# Patient Record
Sex: MALE | Race: WHITE | NOT HISPANIC OR LATINO | Employment: STUDENT | URBAN - METROPOLITAN AREA
[De-identification: names, ages, dates, MRNs, and addresses within clinical notes are randomized per-mention and may not be internally consistent; named-entity substitution may affect disease eponyms.]

---
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
## 2022-04-20 ENCOUNTER — TELEPHONE (OUTPATIENT)
Dept: OTOLARYNGOLOGY | Facility: CLINIC | Age: 22
End: 2022-04-20
Payer: MEDICAID

## 2022-04-20 ENCOUNTER — TELEPHONE (OUTPATIENT)
Dept: GENETICS | Facility: CLINIC | Age: 22
End: 2022-04-20
Payer: MEDICAID

## 2022-04-20 DIAGNOSIS — Q85.01 NEUROFIBROMATOSIS, TYPE 1: Primary | ICD-10-CM

## 2022-04-20 NOTE — TELEPHONE ENCOUNTER
Spoke with pt in reference to he is asking for an adult referral for Neurology. Pt stated that he has not been to Neurology since he was told he had to transition from ped's to adult. I ask pt if he was having any problems, and he stated that he just needs a check up. I informed pt that I would send the message to Dr Mann. Pt verbalized understanding. Please advise

## 2022-04-20 NOTE — TELEPHONE ENCOUNTER
----- Message from Ritika Carvajal sent at 4/20/2022 11:36 AM CDT -----      Please contact patient to schedule NP appt. He was last seen by Dr. Santos in 2017  Patient can be contacted @# [phone]

## 2022-04-20 NOTE — TELEPHONE ENCOUNTER
----- Message from Josselin Vargas sent at 4/20/2022  4:30 PM CDT -----  Contact: Carli [phone]  Pt is calling regards to a referral for an adult neurologist. Please advise    Carli [phone]

## 2022-04-21 ENCOUNTER — TELEPHONE (OUTPATIENT)
Dept: NEUROLOGY | Facility: CLINIC | Age: 22
End: 2022-04-21
Payer: MEDICAID

## 2022-04-21 NOTE — TELEPHONE ENCOUNTER
Called and spoke with pt. Explained do not have provider who sees for neurofibromatosis. Gave number to [facility] and [facility] Neurofibromatosis [phone]. Pt verbalized understanding.

## 2022-04-21 NOTE — TELEPHONE ENCOUNTER
----- Message from [facility] sent at 4/21/2022  8:27 AM CDT -----  Type:  Needs Medical Advice    Who Called: PT   Would the patient rather a call back or a response via [facility]? Callback   Best Call Back Number: [phone]  Additional Information: PT requesting callback from office  to set up neurology appt from referral. Unable to schedule from my end due to pt having medicaid and provider scheduling restrictions.

## 2022-04-21 NOTE — TELEPHONE ENCOUNTER
Spoke with pt and informed him that Dr Mann has placed the adult Neurology referral. I informed pt that he can call [phone] to schedule the appointment. Pt verbalized understanding.

## 2022-05-04 ENCOUNTER — TELEPHONE (OUTPATIENT)
Dept: GENETICS | Facility: CLINIC | Age: 22
End: 2022-05-04
Payer: MEDICAID

## 2022-05-04 NOTE — TELEPHONE ENCOUNTER
----- Message from Manasa Calvillo sent at 5/4/2022  4:00 PM CDT -----  Contact: Self [phone]  Would like to receive medical advice.    Would they like a call back or a response via [facility]:  call back     Additional information:  Calling to request pt neuro referral sent to [facility].

## 2022-06-06 ENCOUNTER — TELEPHONE (OUTPATIENT)
Dept: GENETICS | Facility: CLINIC | Age: 22
End: 2022-06-06
Payer: MEDICAID

## 2022-08-10 ENCOUNTER — TELEPHONE (OUTPATIENT)
Dept: GENETICS | Facility: CLINIC | Age: 22
End: 2022-08-10
Payer: MEDICAID

## 2022-08-11 ENCOUNTER — TELEPHONE (OUTPATIENT)
Dept: NEUROLOGY | Facility: CLINIC | Age: 22
End: 2022-08-11
Payer: MEDICAID

## 2022-08-11 ENCOUNTER — OFFICE VISIT (OUTPATIENT)
Dept: GENETICS | Facility: CLINIC | Age: 22
End: 2022-08-11
Payer: MEDICAID

## 2022-08-11 VITALS
SYSTOLIC BLOOD PRESSURE: 111 MMHG | DIASTOLIC BLOOD PRESSURE: 49 MMHG | HEART RATE: 71 BPM | BODY MASS INDEX: 22.87 KG/M2 | WEIGHT: 159.75 LBS | HEIGHT: 70 IN

## 2022-08-11 DIAGNOSIS — Q85.01 NEUROFIBROMATOSIS, TYPE 1: Primary | ICD-10-CM

## 2022-08-11 DIAGNOSIS — D36.11 NEUROFIBROMA OF FACE: ICD-10-CM

## 2022-08-11 PROCEDURE — 1160F PR REVIEW ALL MEDS BY PRESCRIBER/CLIN PHARMACIST DOCUMENTED: ICD-10-PCS | Mod: CPTII,,, | Performed by: MEDICAL GENETICS

## 2022-08-11 PROCEDURE — 3008F BODY MASS INDEX DOCD: CPT | Mod: CPTII,,, | Performed by: MEDICAL GENETICS

## 2022-08-11 PROCEDURE — 3078F DIAST BP <80 MM HG: CPT | Mod: CPTII,,, | Performed by: MEDICAL GENETICS

## 2022-08-11 PROCEDURE — 99205 PR OFFICE/OUTPT VISIT, NEW, LEVL V, 60-74 MIN: ICD-10-PCS | Mod: S$PBB,,, | Performed by: MEDICAL GENETICS

## 2022-08-11 PROCEDURE — 1159F MED LIST DOCD IN RCRD: CPT | Mod: CPTII,,, | Performed by: MEDICAL GENETICS

## 2022-08-11 PROCEDURE — 3074F SYST BP LT 130 MM HG: CPT | Mod: CPTII,,, | Performed by: MEDICAL GENETICS

## 2022-08-11 PROCEDURE — 3078F PR MOST RECENT DIASTOLIC BLOOD PRESSURE < 80 MM HG: ICD-10-PCS | Mod: CPTII,,, | Performed by: MEDICAL GENETICS

## 2022-08-11 PROCEDURE — 1160F RVW MEDS BY RX/DR IN RCRD: CPT | Mod: CPTII,,, | Performed by: MEDICAL GENETICS

## 2022-08-11 PROCEDURE — 99417 PROLNG OP E/M EACH 15 MIN: CPT | Mod: S$PBB,,, | Performed by: MEDICAL GENETICS

## 2022-08-11 PROCEDURE — 99417 PR PROLONGED SVC, OUTPT, W/WO DIRECT PT CONTACT,  EA ADDTL 15 MIN: ICD-10-PCS | Mod: S$PBB,,, | Performed by: MEDICAL GENETICS

## 2022-08-11 PROCEDURE — 99214 OFFICE O/P EST MOD 30 MIN: CPT | Mod: PBBFAC | Performed by: MEDICAL GENETICS

## 2022-08-11 PROCEDURE — 99205 OFFICE O/P NEW HI 60 MIN: CPT | Mod: S$PBB,,, | Performed by: MEDICAL GENETICS

## 2022-08-11 PROCEDURE — 3008F PR BODY MASS INDEX (BMI) DOCUMENTED: ICD-10-PCS | Mod: CPTII,,, | Performed by: MEDICAL GENETICS

## 2022-08-11 PROCEDURE — 99999 PR PBB SHADOW E&M-EST. PATIENT-LVL IV: CPT | Mod: PBBFAC,,, | Performed by: MEDICAL GENETICS

## 2022-08-11 PROCEDURE — 1159F PR MEDICATION LIST DOCUMENTED IN MEDICAL RECORD: ICD-10-PCS | Mod: CPTII,,, | Performed by: MEDICAL GENETICS

## 2022-08-11 PROCEDURE — 99999 PR PBB SHADOW E&M-EST. PATIENT-LVL IV: ICD-10-PCS | Mod: PBBFAC,,, | Performed by: MEDICAL GENETICS

## 2022-08-11 PROCEDURE — 3074F PR MOST RECENT SYSTOLIC BLOOD PRESSURE < 130 MM HG: ICD-10-PCS | Mod: CPTII,,, | Performed by: MEDICAL GENETICS

## 2022-08-11 NOTE — PROGRESS NOTES
Carli Gunn  DOS: 22  : 00  MRN: [id number]    DIAGNOSIS:  Neurofibromatosis type 1.    PRESENT ILLNESS: Carli is now a 22-year-old  male who was previously diagnosed with NF1 and I had seen him last in 2019. He met minimal clinical criteria for the diagnosis of NF1 and I sent him for ophthalmologic examination and hearing tests as well as to measure his blood pressure, which was normal. Hes followed by ophtho and has Lisch nodules and myopic astigmatism.    Carli now returns for a followup. He did not go to college but is working at [employer] and is doing well. He had Lasik which helped his vision. His blood pressure has been normal. He has a forehead mass (most likely neurofibroma) which has been growing very slowly and Jeffery previously referred him to Dr. Art for excision. However, he decided not to have it done because hed lose sensation to a part of a forehead. However, it grew and started bothering him cosmetically. Today hes inquiring if he could see neurosurgery again.    FAMILY HISTORY: Carli has 24 and 29-year-old sisters and a 23-year-old brother, all of whom have no learning disabilities and no cafe au lait spots.  Further family history is noncontributory.    PHYSICAL EXAM: Wt 159 lbs, Ht 510, BMI 22. /49, HR 71. Midparental height 25-50%. Again, Carli has a normocephalic head and no appreciable dysmorphic facial features, but he does have 1 café au lait spot on his right cheek. He has multiple café au lait spots with at least 6 or more, more than 5 mm in diameter. He also has bilateral axillary and inguinal freckling as well as subcutaneous nodules in the right and left hands, as well as left upper forehead that probably are consistent with neurofibromas but there was no biopsy. His forehead mass has slightly increased in size and he has a brown pigmentation on top. He does not have any pain or discomfort but it does have cosmetic concern to him.         IMPRESSION: At  this time, I had another discussion with the patient about the further natural course of NF1. This is an incurable life-long disease that predisposes to neurofibroma, optic glioma and learning disabilities. There is also a slightly higher risk of malignant peripheral sheath tumors. The mainstay of the treatment is surveillance of the eyes for optic glioma and therefore annual ophthalmologic exams. His blood pressure today and in the past was normal.    Since his forehead mass (presumed neurofibroma) has gotten bigger and it bothers him psychologically, Jeffery advised to see Dr. Art again for a surgical excision but he understands the risks. Theres a medication that could potentially be tried (Selumetinib or brand Koselugo) but it is FDA approved for inoperable plexiform neurofibroma.     RECOMMENDATIONS:   1. Ophthalmologic examination annually.   2. Regular blood pressure measurements.   3. Referral to neurosurgery for the forehead mass (likely neurofibroma) in the future.  4. Vitamin D level was low in the past, he was told to take 2000 IU per day but he didnt. Jeffery instructed him to take it.  5. DXA scan.  6. Referral to vascular team to assess for vascular manifestations of NF1.  7. Follow up in 2 years.    Recent General Guidelines for NF1 care:    HTN  1) For hypertensive NF1 patients who are under 30 years, pregnant and/or have abdominal bruits, causes of renovascular hypertension should be first evaluated.  2) For patients with impaired renal function (GFR <30mL/min) spiral CT and CT angiography may be used. If imaging is negative, consider formal renal angiography. Concomitant screening for pheochromocytoma with plasma free metanephrines also is recommended.  3) Treatment for NF1-associated hypertension should be tailored to the specific etiology.   4) Selective imaging should be performed for patients in whom there is clinical suspicion of a vascular lesion.  5) For identified vascular abnormalities,  decisions regarding timing and type of intervention should be the same as for patients without NF1.     Pheo  1) Pheochromocytoma should be considered in hypertensive NF1 patients who are over 30 years, pregnant and/or have paroxysmal hypertension, hypertension-associated headache, palpitations or sweating.   2) Biochemical or imaging screening in asymptomatic patients with NF1 for pheochromocytoma is not recommended.  3) Plasma free metanephrines as a single test is more sensitive and specific than other studies in an NF1 patient clinically suspected to harbor a pheochromocytoma. A follow-up 24-hour urine collection for catecholamines and metanephrines should be done if plasma testing is equivocal (less than 4-fold elevation).    Bone  1) Although lacking published efficacy, vitamin D supplementation is recommended in individuals with NF1 to reach serum 25-hydroxyvitamin D concentrations in the sufficient range.  2) Osteoporosis occurs at an earlier age in adults with NF1 and progresses over time. Treatment is not different from what is recommended in the general population.   3) Management of scoliosis in NF1 adults consists of monitoring for curve progression. All individuals with NF1 should have annual clinical evaluation of the back.     Cosmesis  1) Current treatment options for cNF include surgical excision, laser removal, or electrodessication. Physician and patient preference and local expertise typically guides choice of these options.   2) Minimal scarring, minor discomfort, and high patient satisfaction has been reported with all three therapies. General anesthesia may be required for the removal of many lesions.   3) Non-surgical therapies (medication and devices) are under development but are not clinically available at this time.  4) NF1-associated pruritus may be neuropathic in origin and thus may be responsive to localized and generalized therapies (including gabapentin and pregabalin), but  NF1-specific data are lacking 97.    Psych  1) Consider screening for depression in adults with NF1, with appropriate referral.  2) There are limited data on the prevalence of ADHD in adults with NF1. ADHD can affect employability and schooling.  3) More severe cognitive impairment merits thorough evaluation (e.g., microarray, fragile X testing, concomitant monogenic disorders) for other etiologies.    Neuro  1) NF1-associated migraine, seizures or sleep disorders should be treated as in the non-NF1 population.  2) Medication, physical therapy, and surgery (for compressive tumors) may be beneficial NF1 neuropathy.   3) Adults with NF1 should be queried about chronic fingertip and toe pain in the assessment of possible glomus tumors.  4) Screening and use of pain interference scales may be useful, with referral to a pain clinic as needed, preferably those that employ both pharmacologic and non-pharmacologic approaches.    MPNST  1) Consider referral to specialized high-volume centers for MPNST diagnosis and management.  2) Imaging of an asymptomatic PN is a clinical judgment. If a lesion involves only subcutaneous tissue with no deeper component, imaging may not be necessary.  3) If a deeper component of a PN is suspected, imaging can be useful to assess the full extent of the lesion, serve as a baseline to  future growth, and to identify whether there is a nodular component that may be associated with an increased risk of malignant change 28.  4) MRI is preferred over CT scan to reduce ionizing radiation exposure.  5) Educate patients about MPNST signs and symptoms at initial and follow-up visits.  6) Theres no evidence supporting special screening for rarer NF1-associated malignancies in asymptomatic patients.    Summary  Although it is important to always consider NF1-associated etiologies for a new sign or symptom, common explanations will remain common (e.g., most hypertension is essential and not  pheochromocytoma-related, most back pain will not be due to an MPNST, most headaches will not be tumor-associated). Repeated patient education and sensitization to worrisome signs and symptoms like progressive severe pain (MPNST), changes in tumor volume (MPNST), new, unexplained neurologic symptoms (MPNST, CNS tumors) and diaphoresis/palpitations (pheochromocytoma) are important.     REFERENCES:   - Doug DR, Ady BR, Amaya KL, William DA, Thomas K. Care of adults with neurofibromatosis type 1: a clinical practice resource of the American College of Medical Genetics and Genomics (ACMG). [employer] Med. 2018 Jul;20(7):671-682.  - DOUG Jensen., ELYSSA Schmidt., ELYSSA Lopez, MARKO Cruz., & ALEX Cardozo. (2007). Neurofibromatosis type 1 in genetic counseling practice: recommendations of the National Society of Genetic Counselors. Journal of genetic counseling, 16(4), 387-407.  - Gloria STEPHENS. Neurofibromatosis 1. 1998 Oct 2 [Updated 2014 Sep 4]. In: Yobani RA, Zach MP, Carine HH, et al., editors. Plasmon® [Internet]. [place] ([place]): [facility], [place]; 4363-0869. Available from: http://www.ncbi.nlm.nih.gov/books/ZOE9042/    Time spent: 60 minutes, more than 50% was spent in counseling. Additional 60 minutes were spent without direct contact, on research of the patients complex medical findings as a part of her condition NF1 and formulating further diagnostic steps and treatment. The note is in epic.     Antolin Mann M.D.                                                                         Section Head - Medical Genetics                                                            [facility]

## 2022-12-06 ENCOUNTER — TELEPHONE (OUTPATIENT)
Dept: NEUROLOGY | Facility: CLINIC | Age: 22
End: 2022-12-06
Payer: MEDICAID

## 2022-12-06 NOTE — TELEPHONE ENCOUNTER
I spoke to the patient's mother, and she confirmed patient appointment on Thursday, 12/8 at 8:40am with Dr. Lynn.

## 2022-12-08 ENCOUNTER — OFFICE VISIT (OUTPATIENT)
Dept: NEUROLOGY | Facility: CLINIC | Age: 22
End: 2022-12-08
Payer: MEDICAID

## 2022-12-08 ENCOUNTER — TELEPHONE (OUTPATIENT)
Dept: NEUROSURGERY | Facility: CLINIC | Age: 22
End: 2022-12-08
Payer: MEDICAID

## 2022-12-08 VITALS
HEIGHT: 70 IN | SYSTOLIC BLOOD PRESSURE: 130 MMHG | DIASTOLIC BLOOD PRESSURE: 88 MMHG | WEIGHT: 159 LBS | HEART RATE: 101 BPM | BODY MASS INDEX: 22.76 KG/M2

## 2022-12-08 DIAGNOSIS — D36.11 NEUROFIBROMA OF FACE: ICD-10-CM

## 2022-12-08 DIAGNOSIS — Q85.01 NEUROFIBROMATOSIS, TYPE 1: Primary | ICD-10-CM

## 2022-12-08 PROCEDURE — 3008F PR BODY MASS INDEX (BMI) DOCUMENTED: ICD-10-PCS | Mod: CPTII,,, | Performed by: PSYCHIATRY & NEUROLOGY

## 2022-12-08 PROCEDURE — 99999 PR PBB SHADOW E&M-EST. PATIENT-LVL III: ICD-10-PCS | Mod: PBBFAC,,, | Performed by: PSYCHIATRY & NEUROLOGY

## 2022-12-08 PROCEDURE — 3079F PR MOST RECENT DIASTOLIC BLOOD PRESSURE 80-89 MM HG: ICD-10-PCS | Mod: CPTII,,, | Performed by: PSYCHIATRY & NEUROLOGY

## 2022-12-08 PROCEDURE — 3075F SYST BP GE 130 - 139MM HG: CPT | Mod: CPTII,,, | Performed by: PSYCHIATRY & NEUROLOGY

## 2022-12-08 PROCEDURE — 99215 OFFICE O/P EST HI 40 MIN: CPT | Mod: S$PBB,,, | Performed by: PSYCHIATRY & NEUROLOGY

## 2022-12-08 PROCEDURE — 1160F PR REVIEW ALL MEDS BY PRESCRIBER/CLIN PHARMACIST DOCUMENTED: ICD-10-PCS | Mod: CPTII,,, | Performed by: PSYCHIATRY & NEUROLOGY

## 2022-12-08 PROCEDURE — 1160F RVW MEDS BY RX/DR IN RCRD: CPT | Mod: CPTII,,, | Performed by: PSYCHIATRY & NEUROLOGY

## 2022-12-08 PROCEDURE — 99215 PR OFFICE/OUTPT VISIT, EST, LEVL V, 40-54 MIN: ICD-10-PCS | Mod: S$PBB,,, | Performed by: PSYCHIATRY & NEUROLOGY

## 2022-12-08 PROCEDURE — 1159F MED LIST DOCD IN RCRD: CPT | Mod: CPTII,,, | Performed by: PSYCHIATRY & NEUROLOGY

## 2022-12-08 PROCEDURE — 99213 OFFICE O/P EST LOW 20 MIN: CPT | Mod: PBBFAC,PO | Performed by: PSYCHIATRY & NEUROLOGY

## 2022-12-08 PROCEDURE — 99999 PR PBB SHADOW E&M-EST. PATIENT-LVL III: CPT | Mod: PBBFAC,,, | Performed by: PSYCHIATRY & NEUROLOGY

## 2022-12-08 PROCEDURE — 3079F DIAST BP 80-89 MM HG: CPT | Mod: CPTII,,, | Performed by: PSYCHIATRY & NEUROLOGY

## 2022-12-08 PROCEDURE — 3008F BODY MASS INDEX DOCD: CPT | Mod: CPTII,,, | Performed by: PSYCHIATRY & NEUROLOGY

## 2022-12-08 PROCEDURE — 1159F PR MEDICATION LIST DOCUMENTED IN MEDICAL RECORD: ICD-10-PCS | Mod: CPTII,,, | Performed by: PSYCHIATRY & NEUROLOGY

## 2022-12-08 PROCEDURE — 3075F PR MOST RECENT SYSTOLIC BLOOD PRESS GE 130-139MM HG: ICD-10-PCS | Mod: CPTII,,, | Performed by: PSYCHIATRY & NEUROLOGY

## 2022-12-08 NOTE — TELEPHONE ENCOUNTER
----- Message from Monika Brush MA sent at 12/8/2022 10:00 AM CST -----  Regarding: RE: question please  Great, thank you.  When someone has a second, can you please reach out to this patient and help him schedule his referral? He has seen Dr. Art before but has a new consult order as well.     Thank you for advance for your help,  Monika HIDALGO     ----- Message -----  From: Gali Iniguez RN  Sent: 12/8/2022   9:54 AM CST  To: Monika Brush MA  Subject: RE: question please                              yes  ----- Message -----  From: Monika Brush MA  Sent: 12/8/2022   9:22 AM CST  To: Kaelyn Moss  Subject: question please                                  Hello,    We just saw this patient this morning and Dr. Lynn wanted to confirm with you that Dr. Dmitry Art sees both adult and pediatric patients?     Thank you for letting us know,  Monika HIDALGO

## 2022-12-08 NOTE — PROGRESS NOTES
[facility] NEUROLOGY  [facility], [place]    Date: 12/8/22  Patient Name: Carli Gunn   MRN: [id number]   PCP: Jacqui Morgan  Referring Provider: Antolin Mann MD    Chief Complaint: NF1  Subjective:   Patient seen in consultation at the request of Antolin Mann MD for the evaluation of NF1. A copy of this note will be sent to the referring physician.      HPI:   Mr. Carli Gunn is a 22 y.o. RH male presenting to establish care given his history of neurofibromatosis type 1 (Lisch nodules, freckling of the axilla, >6 cafe-au-lait spots, no history of optic glioma, at least 1 current large neurofibroma).  Patient has been followed by pediatric neurology in the past with no significant issues.  Established with Genetics.  Has seen Neurosurgery at least once in the past but several years ago.    Overall, the patient states that he is doing well but would like to discuss the removal of the neurofibroma present on the superior lateral forehead/anterior temporal region.  We immediately had a discussion that Neurology would not be the service to be removing such a lesion, but we would assist in scheduling him with correct service for that concern.  We then transitioned our discussion to a variety of other topics concerning his medical history and neurological state.    When prompted regarding headaches, the patient notes that he does infrequently suffer with tension headaches.  Less than 5 days a month, 3-4/10 pain on average, non pulsating, no light/sound sensitivity, no nausea/vomiting.  Duration less than 4 hours. Responds well to OTC analgesics.    No history of seizures or seizure-like activity.  No neuropathic pain in the arms or legs; also prompted specifically about pain in the fingertips and toes which was also denied.  No sleep complaints.  No changes in vision or hearing; most recent eye exam reportedly about 1 month ago.  No uncontrolled hypertension; readings today  "are mildly elevated over previous recordings but still within normal limits.  No history of depression/anxiety; endorses stable mood today.  No GI complaints including pain, constipation, diarrhea  No musculoskeletal complaints at this time; denies history of scoliosis diagnosis    PAST MEDICAL HISTORY:  Patient Active Problem List   Diagnosis    Neurofibromatosis, type 1    Lisch nodules - Both Eyes    Neurofibroma of face    Learning difficulty involving mathematics       PAST SURGICAL HISTORY:  Past Surgical History:   Procedure Laterality Date    CIRCUMCISION, PRIMARY         CURRENT MEDS:  No current outpatient medications on file.     No current facility-administered medications for this visit.       ALLERGIES:  Review of patient's allergies indicates:  No Known Allergies    FAMILY HISTORY:  Family History   Problem Relation Age of Onset    Amblyopia Neg Hx     Blindness Neg Hx     Cataracts Neg Hx     Diabetes Neg Hx     Glaucoma Neg Hx     Retinal detachment Neg Hx     Strabismus Neg Hx        SOCIAL HISTORY:  Social History     Tobacco Use    Smoking status: Never       Review of Systems:  Gen: no fever, no chills, no generalized feeling of weakness   HEENT: no double vision, no blurred vision, no eye pain, no eye exudates. no nasal congestion,   no traumatic injury of head, no neck pain, no neck stiffness. no photophobia or phonophobia at this time. ?    Heart: no chest pain, no SOB    Lungs: no SOB, no cough    MSK: no weakness of legs, intact ROM    ABD: no abd pain, no N/V/D/C, no difficulty with defecation.    Extremities: No leg pain, no edema.       Objective:     Vitals:    12/08/22 0838   BP: 130/88   Pulse: 101   Weight: 72.1 kg (159 lb)   Height: 5' 10" (1.778 m)     General: male in NAD, alert and awake, Aox3, well groomed. ?    ? ?    HEENT: Head is NC/AT EOMI, pupil size: 4 mm B/L, no nystagmus noted; hearing grossly intact b/l. Mucous membrane moist, uvula midline, no pharyngeal erythema, " exudates or discharges.     Probable neurofibroma present on the left superior lateral forehead/anterior temporal region about the hairline     Neck: Supple. no nuchal rigidity.      Cardiovascular: well perfused, no cyanosis        Respiratory: Symmetric chest rise noted       Musculoskeletal/extremities: Muscle tone noted to be adequate for patient age, muscle mass is WNL. No spontaneous movements or fasciculations noted during this examination. No pedal edema or calf tenderness. No cogwheel rigidity noted on B/L UE extremities.    Nodules subcutaneously in the bilateral hands  No significant scoliosis identified    Multiple cafe au lait spots observed on back, abdomen, arms     Neurological Examination.    Mental status: AA&O x3; Affect/mood is euthymic/congruent; no aphasia noted during examination. Patient answers simple questions appropriately & follows simple commands; no dysarthria or expressive aphasia; no lorenza-neglect or extinction. Vocabulary/word finding: excellent.       Cranial Nerves: II-XII grossly intact. visual fields intact to confrontation, EOMI, no nystagmus, PERRLA,?facial muscles symmetric and no facial droop noted, patient hearing is grossly intact b/l, ?facial sensation to sharp and light touch grossly intact B/L. Patient smiles, frowns, closes eyes ?forcefully uneventfully. Uvula midline and no difficulty with pronunciation. No SCM/trapezius/muscle of mastication weakness noted B/L. Patient has adequate control of tongue and may protrude it and move it adequately.       Muscle Function: Tone WNL and Muscle bulk WNL. Left elbow flexors/extensors (5/5), Left shoulder (5/5); left Finger flexor/extensors (5/5). Right elbow flexors/extensors (5/5). Right shoulder abduction/flexion (5/5), Right finger flexors/extensors (5/5). Left LE hip flexion/extension (5/5), Left Knee flexion/extension (5/5) and Left ankle flexion/extension/Eversion/inversion (5/5). Right LE hip flexion/extension (5/5), Right  Knee flexion/extension (5/5) and ankle flexion/extension/Eversion/inversion (5/5).       Sensory: ?Light touch throughout     Reflexes: Left and Right biceps, triceps, brachioradialis are 2+/4. patellar 2+/4 on Left and 2+/4 on Right, B/L Achilles 2+/4     Coordination: no dysmetria (finger to nose negative)     Gait: adequate casual gait with stride length and arm swing WNL. Tandem gait and walking on toes and heels are WNL     Assessment:   Carli Gunn is a 22 y.o. male presenting to [facility] care for his history of neurofibromatosis type 1.  The patient is currently at neurological baseline with no concerns from a General Neurology standpoint.      We discussed that he could continue OTC analgesic use for occasional tension-type headaches; was counseled extensively on the importance reaching out in the future if headaches change or worsen.      I encouraged the patient to continue his routine follow-up with Genetics and Ophthalmology.      We will assist in scheduling a new appointment with his previous neurosurgeon (or an appointment with a new service if they are unavailable) regarding probable neurofibroma of the left face.     Plan:     Problem List Items Addressed This Visit          Neuro    Neurofibromatosis, type 1 - Primary       Oncology    Neurofibroma of face     - continue routine follow-up with Genetics per their recommendations   - continue yearly eye exams with Ophthalmology  - continue occasional use of OTC analgesics for tension-type headaches; reach out to our clinic in the future if headaches change or worsen  - schedule appointment with Neurosurgery   - follow-up with our clinic in 1 year or sooner as needed    I spent a total of 60 minutes on the day of the visit. This includes face to face time and non-face to face time preparing to see the patient (eg, review of tests), obtaining and/or reviewing separately obtained history, documenting clinical information in the electronic or other  health record, independently interpreting results and communicating results to the patient/family/caregiver, or care coordinator.    A dictation device was used to produce this document. Use of such devices sometimes results in grammatical errors or replacement of words that sound similarly.    Brayden Lynn, DO

## 2023-02-13 ENCOUNTER — OFFICE VISIT (OUTPATIENT)
Dept: NEUROSURGERY | Facility: CLINIC | Age: 23
End: 2023-02-13
Payer: MEDICAID

## 2023-02-13 DIAGNOSIS — D36.11 NEUROFIBROMA OF FACE: Primary | ICD-10-CM

## 2023-02-13 DIAGNOSIS — Q85.01 NEUROFIBROMATOSIS, TYPE 1: ICD-10-CM

## 2023-02-13 PROCEDURE — 99203 OFFICE O/P NEW LOW 30 MIN: CPT | Mod: S$PBB,,, | Performed by: STUDENT IN AN ORGANIZED HEALTH CARE EDUCATION/TRAINING PROGRAM

## 2023-02-13 PROCEDURE — 99213 OFFICE O/P EST LOW 20 MIN: CPT | Mod: PBBFAC | Performed by: STUDENT IN AN ORGANIZED HEALTH CARE EDUCATION/TRAINING PROGRAM

## 2023-02-13 PROCEDURE — 99999 PR PBB SHADOW E&M-EST. PATIENT-LVL III: CPT | Mod: PBBFAC,,, | Performed by: STUDENT IN AN ORGANIZED HEALTH CARE EDUCATION/TRAINING PROGRAM

## 2023-02-13 PROCEDURE — 99999 PR PBB SHADOW E&M-EST. PATIENT-LVL III: ICD-10-PCS | Mod: PBBFAC,,, | Performed by: STUDENT IN AN ORGANIZED HEALTH CARE EDUCATION/TRAINING PROGRAM

## 2023-02-13 PROCEDURE — 99203 PR OFFICE/OUTPT VISIT, NEW, LEVL III, 30-44 MIN: ICD-10-PCS | Mod: S$PBB,,, | Performed by: STUDENT IN AN ORGANIZED HEALTH CARE EDUCATION/TRAINING PROGRAM

## 2023-02-13 PROCEDURE — 1159F MED LIST DOCD IN RCRD: CPT | Mod: CPTII,,, | Performed by: STUDENT IN AN ORGANIZED HEALTH CARE EDUCATION/TRAINING PROGRAM

## 2023-02-13 PROCEDURE — 1160F RVW MEDS BY RX/DR IN RCRD: CPT | Mod: CPTII,,, | Performed by: STUDENT IN AN ORGANIZED HEALTH CARE EDUCATION/TRAINING PROGRAM

## 2023-02-13 PROCEDURE — 1160F PR REVIEW ALL MEDS BY PRESCRIBER/CLIN PHARMACIST DOCUMENTED: ICD-10-PCS | Mod: CPTII,,, | Performed by: STUDENT IN AN ORGANIZED HEALTH CARE EDUCATION/TRAINING PROGRAM

## 2023-02-13 PROCEDURE — 1159F PR MEDICATION LIST DOCUMENTED IN MEDICAL RECORD: ICD-10-PCS | Mod: CPTII,,, | Performed by: STUDENT IN AN ORGANIZED HEALTH CARE EDUCATION/TRAINING PROGRAM

## 2023-02-13 NOTE — PROGRESS NOTES
Neurosurgery  History & Physical    SUBJECTIVE:     Chief Complaint: NF1    History of Present Illness:  Carli Gunn is a 21 yo male with h/o NF1 who was referred by Dr. Lynn for evaluation of a likely neurofibroma of the left lateral forehead.  He was previously referred for possible excision, however patient initially was not interested in surgery but would like to reconsider now.  He denies any changes to this lesion and rgere us no associated pain at this site.  He does report more recent development of a painful lump in his left inner thigh that is tender to palpation w/ associated burning pain.  He only noticed this mass in the last 1-2 months, however his mother feels it has been present much longer.  No other symptoms or concerns reported today.      Carli has bilateral Lisch nodules, multiple cafe au lait spots (>6), axillary freckling, and cutaneous neurofibroma. No history of optic glioma.   He is followed by neurology, ophthalmology and genetics.      Review of patient's allergies indicates:  No Known Allergies    No current outpatient medications on file.     No current facility-administered medications for this visit.       History reviewed. No pertinent past medical history.  Past Surgical History:   Procedure Laterality Date    CIRCUMCISION, PRIMARY       Family History    None       Social History     Socioeconomic History    Marital status: Single   Tobacco Use    Smoking status: Never   Social History Narrative    12th grade at [employer]       Review of Systems   Musculoskeletal:         Leg pain   All other systems reviewed and are negative.    OBJECTIVE:     Vital Signs  Pain Score: 0-No pain  There is no height or weight on file to calculate BMI.      Physical Exam:  Nursing note and vitals reviewed.  Awake, alert, NAD  No aphasia or dysarthia  PERRL, EOMI, face symm, tongue midline  Left ant temporal/lateral forehead cutaneous mass with associated skin changes that appear stable from  prior pictures in the chart. Soft, no TTP.  Sm non-tender, soft cutaneous lesions in the bilateral antecubital fossa.  Firm left medial thigh mass, +TTP  MAEW, full strength  Gait independent, + toe/heel walk, mild difficulty with tandem gait    Diagnostic Results:  MRI brain w/wo 2017 was personally reviewed    ASSESSMENT/PLAN:     23 yo male with NF1 and left lateral forehead subcutaneous mass, likely neurofibroma as well as a firm soft tissue mass along the medial left thigh that has recently become painful in the last month.  He needs updated imaging of the head/brain and the left leg.  - f/u when imaging completed        Note dictated with voice recognition software, please excuse any grammatical errors.

## 2023-03-07 ENCOUNTER — HOSPITAL ENCOUNTER (OUTPATIENT)
Dept: RADIOLOGY | Facility: HOSPITAL | Age: 23
Discharge: HOME | End: 2023-03-07
Attending: STUDENT IN AN ORGANIZED HEALTH CARE EDUCATION/TRAINING PROGRAM
Payer: MEDICAID

## 2023-03-07 DIAGNOSIS — Q85.01 NEUROFIBROMATOSIS, TYPE 1: ICD-10-CM

## 2023-03-07 PROCEDURE — 70553 MRI BRAIN STEM W/O & W/DYE: CPT | Mod: 26,,, | Performed by: RADIOLOGY

## 2023-03-07 PROCEDURE — 73720 MRI FEMUR W WO CONTRAST LEFT: ICD-10-PCS | Mod: 26,LT,, | Performed by: RADIOLOGY

## 2023-03-07 PROCEDURE — 73720 MRI LWR EXTREMITY W/O&W/DYE: CPT | Mod: TC,LT

## 2023-03-07 PROCEDURE — 25500020 PHARM REV CODE 255: Performed by: STUDENT IN AN ORGANIZED HEALTH CARE EDUCATION/TRAINING PROGRAM

## 2023-03-07 PROCEDURE — 70553 MRI BRAIN STEM W/O & W/DYE: CPT | Mod: TC

## 2023-03-07 PROCEDURE — 73720 MRI LWR EXTREMITY W/O&W/DYE: CPT | Mod: 26,LT,, | Performed by: RADIOLOGY

## 2023-03-07 PROCEDURE — A9585 GADOBUTROL INJECTION: HCPCS | Performed by: STUDENT IN AN ORGANIZED HEALTH CARE EDUCATION/TRAINING PROGRAM

## 2023-03-07 PROCEDURE — 70553 MRI BRAIN W WO CONTRAST: ICD-10-PCS | Mod: 26,,, | Performed by: RADIOLOGY

## 2023-03-07 RX ORDER — GADOBUTROL 604.72 MG/ML
8 INJECTION INTRAVENOUS
Status: COMPLETED | OUTPATIENT
Start: 2023-03-07 | End: 2023-03-07

## 2023-03-07 RX ADMIN — GADOBUTROL 8 ML: 604.72 INJECTION INTRAVENOUS at 06:03

## 2023-03-13 DIAGNOSIS — Q85.01 NEUROFIBROMATOSIS, TYPE 1: Primary | ICD-10-CM

## 2023-03-13 DIAGNOSIS — C72.30 OPTIC NERVE GLIOMA: ICD-10-CM

## 2023-03-21 ENCOUNTER — TELEPHONE (OUTPATIENT)
Dept: OPHTHALMOLOGY | Facility: CLINIC | Age: 23
End: 2023-03-21
Payer: MEDICAID

## 2023-03-21 ENCOUNTER — OFFICE VISIT (OUTPATIENT)
Dept: NEUROSURGERY | Facility: CLINIC | Age: 23
End: 2023-03-21
Payer: MEDICAID

## 2023-03-21 DIAGNOSIS — C72.30 OPTIC NERVE GLIOMA: Primary | ICD-10-CM

## 2023-03-21 DIAGNOSIS — Q85.01 NEUROFIBROMATOSIS, TYPE 1: ICD-10-CM

## 2023-03-21 PROCEDURE — 99212 OFFICE O/P EST SF 10 MIN: CPT | Mod: PBBFAC | Performed by: STUDENT IN AN ORGANIZED HEALTH CARE EDUCATION/TRAINING PROGRAM

## 2023-03-21 PROCEDURE — 99213 PR OFFICE/OUTPT VISIT, EST, LEVL III, 20-29 MIN: ICD-10-PCS | Mod: S$PBB,,, | Performed by: STUDENT IN AN ORGANIZED HEALTH CARE EDUCATION/TRAINING PROGRAM

## 2023-03-21 PROCEDURE — 99213 OFFICE O/P EST LOW 20 MIN: CPT | Mod: S$PBB,,, | Performed by: STUDENT IN AN ORGANIZED HEALTH CARE EDUCATION/TRAINING PROGRAM

## 2023-03-21 PROCEDURE — 1160F RVW MEDS BY RX/DR IN RCRD: CPT | Mod: CPTII,,, | Performed by: STUDENT IN AN ORGANIZED HEALTH CARE EDUCATION/TRAINING PROGRAM

## 2023-03-21 PROCEDURE — 99999 PR PBB SHADOW E&M-EST. PATIENT-LVL II: CPT | Mod: PBBFAC,,, | Performed by: STUDENT IN AN ORGANIZED HEALTH CARE EDUCATION/TRAINING PROGRAM

## 2023-03-21 PROCEDURE — 1159F PR MEDICATION LIST DOCUMENTED IN MEDICAL RECORD: ICD-10-PCS | Mod: CPTII,,, | Performed by: STUDENT IN AN ORGANIZED HEALTH CARE EDUCATION/TRAINING PROGRAM

## 2023-03-21 PROCEDURE — 1159F MED LIST DOCD IN RCRD: CPT | Mod: CPTII,,, | Performed by: STUDENT IN AN ORGANIZED HEALTH CARE EDUCATION/TRAINING PROGRAM

## 2023-03-21 PROCEDURE — 99999 PR PBB SHADOW E&M-EST. PATIENT-LVL II: ICD-10-PCS | Mod: PBBFAC,,, | Performed by: STUDENT IN AN ORGANIZED HEALTH CARE EDUCATION/TRAINING PROGRAM

## 2023-03-21 PROCEDURE — 1160F PR REVIEW ALL MEDS BY PRESCRIBER/CLIN PHARMACIST DOCUMENTED: ICD-10-PCS | Mod: CPTII,,, | Performed by: STUDENT IN AN ORGANIZED HEALTH CARE EDUCATION/TRAINING PROGRAM

## 2023-03-21 NOTE — PROGRESS NOTES
Neurosurgery  Established Patient    SUBJECTIVE:     History of Present Illness:  Carli Gunn is a 22 yo male with h/o NF1 who was initially seen by me for surgical evaluation of a likely neurofibroma of the left lateral forehead as the patient is now interested in excision.  At that visit, he also reported recent development of a painful lump in his left inner thigh that is tender to palpation w/ associated burning pain in the last 1-2 months.  Carli has bilateral Lisch nodules, multiple cafe au lait spots (>6), axillary freckling, and cutaneous neurofibroma. No history of optic glioma.   He has been followed by neurology, ophthalmology and genetics but has not seen ophthalmology recently.      Interval 3/21/2023: Carli returns after MRI studies of the brain and femur.  Denies any interval changes since his last visit. Left thigh mass has not changed in size and is less tender.  He denies any recent vision changes.    Review of patient's allergies indicates:  No Known Allergies    No current outpatient medications on file.     No current facility-administered medications for this visit.       History reviewed. No pertinent past medical history.  Past Surgical History:   Procedure Laterality Date    CIRCUMCISION, PRIMARY       Family History    None       Social History     Socioeconomic History    Marital status: Single   Tobacco Use    Smoking status: Never   Social History Narrative    12th grade at [employer]       Review of Systems   Eyes:         Lisch nodules   Skin:         Neurfibromas  Axillary freckling  Cafe au lait spots   All other systems reviewed and are negative.    OBJECTIVE:     Vital Signs  Pain Score: 0-No pain  There is no height or weight on file to calculate BMI.    Physical Exam:  Nursing note and vitals reviewed.  General: well developed, well nourished, no distress.   Head: left forehead soft tissue mass, soft and compressible/ nontender  Extremities: left inner thigh soft tissue mass  is soft & non-tender    Neuro:  Mental Status: Alert and oriented. Oriented x 4  Language/language: No aphasia. No dysarthria. Names 3/3 objects & repetition intact  Cranial nerves: PERRL, EOMI, face symmetric w/ intact sensation to LT, tongue/palate midline, gag intact.   Sensory: intact to light touch throughout  Motor Strength: Full strength upper and lower extremities.  No pronator drift  Cerebellar: Finger-to-nose: intact bilaterally   Gait stable, fluid. No difficulty with tandem gait: Able to walk on heels & toes  Spine: cervical ROM full with flexion, extension, lateral rotation and ear-to-shoulder bend.     Diagnostic Results:  MRI brain and femur w/wo were personally reviewed.    ASSESSMENT/PLAN:     22 yo male with NF1 with multiple cutaneous neurofibromas who is interested in surgical excision of his left forehead likely neurofibroma.  There is prominence of the right optic nerve on his MRI concerning for ON glioma and he needs a dedicated MRI of the orbit (ordered) and neuro-ophthalmology evaluation (referral placed, appointment pending).     The largest of his soft tissue masses/ likely neurofibromas is over the medial left thigh and measures  4.9 x 3.5 cm  with irregular enhancement on MRI.  This developed recently over a short interval but without significant interval growth or associated pain on exam today.  He will need close interval follow up imaging for this.      - f/u 1 month        Note dictated with voice recognition software, please excuse any grammatical errors.

## 2023-03-21 NOTE — TELEPHONE ENCOUNTER
----- Message from Sindhu Dawson MA sent at 3/21/2023 11:18 AM CDT -----  Good Morning  Dr St is referring this pt neuro ophthalmology. Any assistance scheduling will be appreciated!  Sindhu

## 2023-04-19 ENCOUNTER — PATIENT MESSAGE (OUTPATIENT)
Dept: NEUROSURGERY | Facility: CLINIC | Age: 23
End: 2023-04-19
Payer: MEDICAID

## 2023-06-21 ENCOUNTER — OFFICE VISIT (OUTPATIENT)
Dept: OPHTHALMOLOGY | Facility: CLINIC | Age: 23
End: 2023-06-21
Payer: MEDICAID

## 2023-06-21 ENCOUNTER — CLINICAL SUPPORT (OUTPATIENT)
Dept: OPHTHALMOLOGY | Facility: CLINIC | Age: 23
End: 2023-06-21
Payer: MEDICAID

## 2023-06-21 DIAGNOSIS — C72.30 OPTIC NERVE GLIOMA: Primary | ICD-10-CM

## 2023-06-21 DIAGNOSIS — H53.15 VISUAL DISTORTIONS OF SHAPE AND SIZE: ICD-10-CM

## 2023-06-21 DIAGNOSIS — Q85.01 NEUROFIBROMATOSIS, TYPE 1: ICD-10-CM

## 2023-06-21 PROCEDURE — 99213 OFFICE O/P EST LOW 20 MIN: CPT | Mod: PBBFAC | Performed by: STUDENT IN AN ORGANIZED HEALTH CARE EDUCATION/TRAINING PROGRAM

## 2023-06-21 PROCEDURE — 92133 CPTRZD OPH DX IMG PST SGM ON: CPT | Mod: PBBFAC | Performed by: STUDENT IN AN ORGANIZED HEALTH CARE EDUCATION/TRAINING PROGRAM

## 2023-06-21 PROCEDURE — 99205 OFFICE O/P NEW HI 60 MIN: CPT | Mod: S$PBB,,, | Performed by: STUDENT IN AN ORGANIZED HEALTH CARE EDUCATION/TRAINING PROGRAM

## 2023-06-21 PROCEDURE — 1159F PR MEDICATION LIST DOCUMENTED IN MEDICAL RECORD: ICD-10-PCS | Mod: CPTII,,, | Performed by: STUDENT IN AN ORGANIZED HEALTH CARE EDUCATION/TRAINING PROGRAM

## 2023-06-21 PROCEDURE — 99999 PR PBB SHADOW E&M-EST. PATIENT-LVL III: ICD-10-PCS | Mod: PBBFAC,,, | Performed by: STUDENT IN AN ORGANIZED HEALTH CARE EDUCATION/TRAINING PROGRAM

## 2023-06-21 PROCEDURE — 92133 POSTERIOR SEGMENT OCT OPTIC NERVE(OCULAR COHERENCE TOMOGRAPHY) - OU - BOTH EYES: ICD-10-PCS | Mod: 26,S$PBB,, | Performed by: STUDENT IN AN ORGANIZED HEALTH CARE EDUCATION/TRAINING PROGRAM

## 2023-06-21 PROCEDURE — 99999 PR PBB SHADOW E&M-EST. PATIENT-LVL III: CPT | Mod: PBBFAC,,, | Performed by: STUDENT IN AN ORGANIZED HEALTH CARE EDUCATION/TRAINING PROGRAM

## 2023-06-21 PROCEDURE — 99205 PR OFFICE/OUTPT VISIT, NEW, LEVL V, 60-74 MIN: ICD-10-PCS | Mod: S$PBB,,, | Performed by: STUDENT IN AN ORGANIZED HEALTH CARE EDUCATION/TRAINING PROGRAM

## 2023-06-21 PROCEDURE — 1160F RVW MEDS BY RX/DR IN RCRD: CPT | Mod: CPTII,,, | Performed by: STUDENT IN AN ORGANIZED HEALTH CARE EDUCATION/TRAINING PROGRAM

## 2023-06-21 PROCEDURE — 92083 EXTENDED VISUAL FIELD XM: CPT | Mod: PBBFAC | Performed by: STUDENT IN AN ORGANIZED HEALTH CARE EDUCATION/TRAINING PROGRAM

## 2023-06-21 PROCEDURE — 92083 HUMPHREY VISUAL FIELD - OU - BOTH EYES: ICD-10-PCS | Mod: 26,S$PBB,, | Performed by: STUDENT IN AN ORGANIZED HEALTH CARE EDUCATION/TRAINING PROGRAM

## 2023-06-21 PROCEDURE — 1159F MED LIST DOCD IN RCRD: CPT | Mod: CPTII,,, | Performed by: STUDENT IN AN ORGANIZED HEALTH CARE EDUCATION/TRAINING PROGRAM

## 2023-06-21 PROCEDURE — 1160F PR REVIEW ALL MEDS BY PRESCRIBER/CLIN PHARMACIST DOCUMENTED: ICD-10-PCS | Mod: CPTII,,, | Performed by: STUDENT IN AN ORGANIZED HEALTH CARE EDUCATION/TRAINING PROGRAM

## 2023-06-21 NOTE — PROGRESS NOTES
"Date:  6/21/2023    ?  Referring Provider:   Karissa St MD    Copies of Letters to the Following:   Karissa St MD    Chief Complaint:  I saw Carli Gunn at the [facility] for neuro-ophthalmic evaluation.   He is a 23 y.o. 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    History:     HPI    Referred: Dr. St      24 yo male present to clinic for Neuro-ophthalmic evaluation for history   of NF1. H/o of bilateral Lasik s/x - 2022. Pt states last eye exam was X 1   year ago. Pt is holding neurofibroma of the left lateral forehead s/x due   to upcoming inner thigh procedure. Pt states vision is well. Occasional   migraines and tinnitus. Last hearing exam was over X 2 years ago. No   floaters, flashes of lights, diplopia, headaches, or eye allergies   reporting.     Eyemeds  No gtts  Last edited by Pati Garza on 6/21/2023  8:41 AM.        3/21/2023 Maciel:  "evaluation of a likely neurofibroma of the left lateral forehead as the patient is now interested in excision.  At that visit, he also reported recent development of a painful lump in his left inner thigh that is tender to palpation w/ associated burning pain in the last 1-2 months.  Carli has bilateral Lisch nodules, multiple cafe au lait spots (>6), axillary freckling, and cutaneous neurofibroma. No history of optic glioma.   He has been followed by neurology, ophthalmology and genetics but has not seen ophthalmology recently.       Interval 3/21/2023: Carli returns after MRI studies of the brain and femur.  Denies any interval changes since his last visit. Left thigh mass has not changed in size and is less tender.  He denies any recent vision changes.   "    Review of patient's allergies indicates:  No Known Allergies  History reviewed. No pertinent past medical history.  Past Surgical History:   Procedure Laterality Date    CIRCUMCISION, PRIMARY   "     Family History   Problem Relation Age of Onset    Amblyopia Neg Hx     Blindness Neg Hx     Cataracts Neg Hx     Diabetes Neg Hx     Glaucoma Neg Hx     Retinal detachment Neg Hx     Strabismus Neg Hx      Social History     Socioeconomic History    Marital status: Single   Tobacco Use    Smoking status: Never   Social History Narrative    12th grade at [employer]       Examination:  He was well-appearing. He was alert and oriented. Attention span and concentration were normal. Speech, language, memory, and general knowledge were intact.      His distance visual acuity without correction was 20/20  in the right eye and 20/20  in the left eye. His near visual acuity without correction was J1 in the right eye and J1 in the left eye. \     He perceived 3/8 OD and 1/8 OS Ishihara color plates correctly. Pupils were brisk to light without an afferent defect. No red desaturation. Ocular ductions were full. Orthophoric in primary, right, and left gaze by cross cover. There was no nystagmus. Saccades and pursuits were normal. Lids were symmetric. Christopher exophthalmometry OD 19 OS 20 on base of 118    Optic discs with no edema, mild temporal pallor OU. . Pupillary dilation was not necessary for visualization of the optic disc today.     Laboratories Reviewed:     N/a  ?  Neuroimaging Reviewed:     3/2023 MRI brain w/wo contrast  There is no evidence of hydrocephalus mass effect intracranial hemorrhage or acute infarct.  The brain parenchyma maintains normal signal intensity.  No enhancing intracranial lesion.     There is mild enlargement of the intraorbital portion of the right optic nerve without enhancement that presumably represents optic nerve glioma formation in light of the history.  Left sphenoid wing hypoplasia.     Presumed neurofibromas left frontal and temporal scalp again identified     Normal arterial flow voids are preserved at the skull base.     The visualized sinuses and mastoid air cells are  clear.     These findings were communicated to Dr. St at 09:50 on 03/08/2023.     Impression:     Scalp presumed neurofibroma is again identified.     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  On subsequent follow-up, MR imaging of the brain and orbits recommended.    Ocular Imaging, Photos, Records Reviewed:     OCT RNFL Today 6/21/2023:   Right Eye - Average RNFL 83 borderline temporal thinning   Left Eye - Average RNFL 87 temporal thinning     Normal macular architecture OU    Visual Field Test 24-2 OU Today 6/21/2023: Right Eye - fixation losses 1/11, false positives 0%, false negatives 1%, MD -2.81dB, Impression OD: mild scattered points. Left Eye - fixation losses 1/10, false positives 0%, false negatives 0%, MD -1.97dB, Impression OS: few scattered very mild points.  ?  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 They report no subjective changes in vision. Neuro-ophthalmologic examination was notable for excellent visual acuities, full color vision, normal ocular motility and alignment. OCT with some mild temporal thinning OU. Formal visual fields were without any significant visual field defects.  ?  Plan:  1. Follow up with Dr. St as planned  2. Follow up with optometry/ophthalmology for yearly routine eye exams and refraction needs    Follow-up:  I will see him in follow-up in 1 year or sooner with any change.  ?OCT and HVF  ?  Visit Checklist (as applicable):  1. Status of new and prior symptoms discussed? yes  2. Neuroimaging reviewed/ ordered as appropriate? yes  3. Ocular imaging and photos reviewed/ ordered as appropriate? yes  4. Plan for work-up and treatment discussed with patient? yes  5. Potential medication side-effects and monitoring plan discussed? N/a  6.  Review of outside medical records was performed and pertinent details are summarized in the HPI above? N/a    Time spent on this encounter: 60 minutes. This includes face to face time and non-face to face time preparing to see the patient (eg, review of tests), obtaining and/or reviewing separately obtained history, documenting clinical information in the electronic or other health record, independently interpreting results and communicating results to the patient/family/caregiver, or care coordinator.      KAREL Galan  Neuro-Ophthalmology Consultant

## 2023-06-21 NOTE — LETTER
[facility] - 10th Fl  [address]  [place] [address]  Phone: [phone]  Fax: [phone]   June 21, 2023    Karissa St MD  [address] [facility]  Department Of Neurosurgery - 7th Floor  [facility] [address]    Patient: Carli Gunn   MR Number: [id number]   YOB: 2000   Date of Visit: 6/21/2023       Dear Dr. St :    Thank you for referring Carli Gunn to me for evaluation. 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 They report no subjective changes in vision. Neuro-ophthalmologic examination was notable for excellent visual acuities, full color vision, normal ocular motility and alignment. OCT with some mild temporal thinning OU. Formal visual fields were without any significant visual field defects.     Plan:  1. Follow up with Dr. St as planned  2. Follow up with optometry/ophthalmology for yearly routine eye exams and refraction needs    Follow-up:  I will see him in follow-up in 1 year or sooner with any change.     If you have questions, please do not hesitate to call me. I look forward to following Mr. Carli Gunn along with you.    Sincerely,        Beatriz Torres MD       CC  No Recipients

## 2023-06-27 ENCOUNTER — HOSPITAL ENCOUNTER (OUTPATIENT)
Dept: RADIOLOGY | Facility: HOSPITAL | Age: 23
Discharge: HOME | End: 2023-06-27
Attending: STUDENT IN AN ORGANIZED HEALTH CARE EDUCATION/TRAINING PROGRAM
Payer: MEDICAID

## 2023-06-27 DIAGNOSIS — Q85.01 NEUROFIBROMATOSIS, TYPE 1: ICD-10-CM

## 2023-06-27 PROCEDURE — 25500020 PHARM REV CODE 255: Performed by: STUDENT IN AN ORGANIZED HEALTH CARE EDUCATION/TRAINING PROGRAM

## 2023-06-27 PROCEDURE — 73720 MRI FEMUR W WO CONTRAST LEFT: ICD-10-PCS | Mod: 26,LT,, | Performed by: RADIOLOGY

## 2023-06-27 PROCEDURE — 73720 MRI LWR EXTREMITY W/O&W/DYE: CPT | Mod: 26,LT,, | Performed by: RADIOLOGY

## 2023-06-27 PROCEDURE — 73720 MRI LWR EXTREMITY W/O&W/DYE: CPT | Mod: TC,LT

## 2023-06-27 PROCEDURE — A9585 GADOBUTROL INJECTION: HCPCS | Performed by: STUDENT IN AN ORGANIZED HEALTH CARE EDUCATION/TRAINING PROGRAM

## 2023-06-27 RX ORDER — GADOBUTROL 604.72 MG/ML
8 INJECTION INTRAVENOUS
Status: COMPLETED | OUTPATIENT
Start: 2023-06-27 | End: 2023-06-27

## 2023-06-27 RX ADMIN — GADOBUTROL 8 ML: 604.72 INJECTION INTRAVENOUS at 02:06

## 2023-07-14 ENCOUNTER — HOSPITAL ENCOUNTER (OUTPATIENT)
Dept: RADIOLOGY | Facility: CLINIC | Age: 23
Discharge: HOME | End: 2023-07-14
Attending: MEDICAL GENETICS
Payer: MEDICAID

## 2023-07-14 DIAGNOSIS — Q85.01 NEUROFIBROMATOSIS, TYPE 1: ICD-10-CM

## 2023-07-14 PROCEDURE — 77080 DXA BONE DENSITY AXIAL: CPT | Mod: TC

## 2023-07-14 PROCEDURE — 77080 DEXA BONE DENSITY SPINE HIP: ICD-10-PCS | Mod: 26,,, | Performed by: INTERNAL MEDICINE

## 2023-07-14 PROCEDURE — 77080 DXA BONE DENSITY AXIAL: CPT | Mod: 26,,, | Performed by: INTERNAL MEDICINE

## 2023-07-20 ENCOUNTER — HOSPITAL ENCOUNTER (OUTPATIENT)
Dept: RADIOLOGY | Facility: HOSPITAL | Age: 23
Discharge: HOME | End: 2023-07-20
Attending: STUDENT IN AN ORGANIZED HEALTH CARE EDUCATION/TRAINING PROGRAM
Payer: MEDICAID

## 2023-07-20 PROCEDURE — A9585 GADOBUTROL INJECTION: HCPCS | Performed by: STUDENT IN AN ORGANIZED HEALTH CARE EDUCATION/TRAINING PROGRAM

## 2023-07-20 PROCEDURE — 70553 MRI BRAIN STEM W/O & W/DYE: CPT

## 2023-07-20 PROCEDURE — 25500020 PHARM REV CODE 255: Performed by: STUDENT IN AN ORGANIZED HEALTH CARE EDUCATION/TRAINING PROGRAM

## 2023-07-20 PROCEDURE — 70543 MRI HEAD-ORBITS W/WO CONTRAST (XPD): ICD-10-PCS | Mod: 26,,, | Performed by: RADIOLOGY

## 2023-07-20 PROCEDURE — 70543 MRI ORBT/FAC/NCK W/O &W/DYE: CPT | Mod: 26,,, | Performed by: RADIOLOGY

## 2023-07-20 PROCEDURE — 70553 MRI BRAIN STEM W/O & W/DYE: CPT | Mod: 26,,, | Performed by: RADIOLOGY

## 2023-07-20 PROCEDURE — 70553 MRI HEAD-ORBITS W/WO CONTRAST (XPD): ICD-10-PCS | Mod: 26,,, | Performed by: RADIOLOGY

## 2023-07-20 PROCEDURE — 70543 MRI ORBT/FAC/NCK W/O &W/DYE: CPT | Mod: TC

## 2023-07-20 RX ORDER — GADOBUTROL 604.72 MG/ML
8 INJECTION INTRAVENOUS
Status: COMPLETED | OUTPATIENT
Start: 2023-07-20 | End: 2023-07-20

## 2023-07-20 RX ADMIN — GADOBUTROL 8 ML: 604.72 INJECTION INTRAVENOUS at 08:07

## 2023-07-25 ENCOUNTER — OFFICE VISIT (OUTPATIENT)
Dept: NEUROSURGERY | Facility: CLINIC | Age: 23
End: 2023-07-25
Payer: MEDICAID

## 2023-07-25 DIAGNOSIS — Q85.01 NEUROFIBROMATOSIS, TYPE 1: Primary | ICD-10-CM

## 2023-07-25 DIAGNOSIS — C72.30 OPTIC NERVE GLIOMA: ICD-10-CM

## 2023-07-25 PROCEDURE — 99213 OFFICE O/P EST LOW 20 MIN: CPT | Mod: 95,,, | Performed by: STUDENT IN AN ORGANIZED HEALTH CARE EDUCATION/TRAINING PROGRAM

## 2023-07-25 PROCEDURE — 1160F PR REVIEW ALL MEDS BY PRESCRIBER/CLIN PHARMACIST DOCUMENTED: ICD-10-PCS | Mod: CPTII,95,, | Performed by: STUDENT IN AN ORGANIZED HEALTH CARE EDUCATION/TRAINING PROGRAM

## 2023-07-25 PROCEDURE — 1159F PR MEDICATION LIST DOCUMENTED IN MEDICAL RECORD: ICD-10-PCS | Mod: CPTII,95,, | Performed by: STUDENT IN AN ORGANIZED HEALTH CARE EDUCATION/TRAINING PROGRAM

## 2023-07-25 PROCEDURE — 1159F MED LIST DOCD IN RCRD: CPT | Mod: CPTII,95,, | Performed by: STUDENT IN AN ORGANIZED HEALTH CARE EDUCATION/TRAINING PROGRAM

## 2023-07-25 PROCEDURE — 99213 PR OFFICE/OUTPT VISIT, EST, LEVL III, 20-29 MIN: ICD-10-PCS | Mod: 95,,, | Performed by: STUDENT IN AN ORGANIZED HEALTH CARE EDUCATION/TRAINING PROGRAM

## 2023-07-25 PROCEDURE — 1160F RVW MEDS BY RX/DR IN RCRD: CPT | Mod: CPTII,95,, | Performed by: STUDENT IN AN ORGANIZED HEALTH CARE EDUCATION/TRAINING PROGRAM

## 2023-07-27 NOTE — PROGRESS NOTES
The patient location is: [place]  The chief complaint leading to consultation is: NF1    Visit type: audiovisual    Face to Face time with patient: 20 min  40 minutes of total time spent on the encounter, which includes face to face time and non-face to face time preparing to see the patient (eg, review of tests), Obtaining and/or reviewing separately obtained history, Documenting clinical information in the electronic or other health record, Independently interpreting results (not separately reported) and communicating results to the patient/family/caregiver, or Care coordination (not separately reported).         Each patient to whom he or she provides medical services by telemedicine is:  (1) informed of the relationship between the physician and patient and the respective role of any other health care provider with respect to management of the patient; and (2) notified that he or she may decline to receive medical services by telemedicine and may withdraw from such care at any time.    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  Carli has bilateral Lisch nodules, multiple cafe au lait spots (>6), axillary freckling, and cutaneous neurofibroma. 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  Since his last visit, Carli saw Dr. Torres, neuro-ophthalmology (note reviewed) and also had repeat MRI of the left leg that shows stable appearance of the larger heterogenously enhancing neurofibroma.  He denies any recent changes or new concerns since his last visit. He is interested in possible surgical excision of the left thigh neurofibroma and I have asked him to return for an in person visit for an updated exam and continued discussion of management options  including continued surveillance and surgical biopsy versus resection.

## 2023-08-18 ENCOUNTER — TELEPHONE (OUTPATIENT)
Dept: NEUROSURGERY | Facility: CLINIC | Age: 23
End: 2023-08-18
Payer: MEDICAID

## 2023-08-18 NOTE — TELEPHONE ENCOUNTER
Called - no answer. Left VM informing pt of scheduled time and date for in-person appt with Dr. St.  I also sent appt letters in mail

## 2023-12-26 ENCOUNTER — OFFICE VISIT (OUTPATIENT)
Dept: NEUROSURGERY | Facility: CLINIC | Age: 23
End: 2023-12-26
Payer: MEDICAID

## 2023-12-26 VITALS
BODY MASS INDEX: 22.9 KG/M2 | SYSTOLIC BLOOD PRESSURE: 117 MMHG | DIASTOLIC BLOOD PRESSURE: 73 MMHG | HEART RATE: 73 BPM | WEIGHT: 160 LBS | HEIGHT: 70 IN

## 2023-12-26 DIAGNOSIS — Q85.01 NEUROFIBROMATOSIS, TYPE 1: Primary | ICD-10-CM

## 2023-12-26 PROCEDURE — 3078F DIAST BP <80 MM HG: CPT | Mod: CPTII,,, | Performed by: STUDENT IN AN ORGANIZED HEALTH CARE EDUCATION/TRAINING PROGRAM

## 2023-12-26 PROCEDURE — 99213 PR OFFICE/OUTPT VISIT, EST, LEVL III, 20-29 MIN: ICD-10-PCS | Mod: S$PBB,,, | Performed by: STUDENT IN AN ORGANIZED HEALTH CARE EDUCATION/TRAINING PROGRAM

## 2023-12-26 PROCEDURE — 3074F SYST BP LT 130 MM HG: CPT | Mod: CPTII,,, | Performed by: STUDENT IN AN ORGANIZED HEALTH CARE EDUCATION/TRAINING PROGRAM

## 2023-12-26 PROCEDURE — 3074F PR MOST RECENT SYSTOLIC BLOOD PRESSURE < 130 MM HG: ICD-10-PCS | Mod: CPTII,,, | Performed by: STUDENT IN AN ORGANIZED HEALTH CARE EDUCATION/TRAINING PROGRAM

## 2023-12-26 PROCEDURE — 3008F PR BODY MASS INDEX (BMI) DOCUMENTED: ICD-10-PCS | Mod: CPTII,,, | Performed by: STUDENT IN AN ORGANIZED HEALTH CARE EDUCATION/TRAINING PROGRAM

## 2023-12-26 PROCEDURE — 99999 PR PBB SHADOW E&M-EST. PATIENT-LVL III: ICD-10-PCS | Mod: PBBFAC,,, | Performed by: STUDENT IN AN ORGANIZED HEALTH CARE EDUCATION/TRAINING PROGRAM

## 2023-12-26 PROCEDURE — 99213 OFFICE O/P EST LOW 20 MIN: CPT | Mod: PBBFAC | Performed by: STUDENT IN AN ORGANIZED HEALTH CARE EDUCATION/TRAINING PROGRAM

## 2023-12-26 PROCEDURE — 1160F RVW MEDS BY RX/DR IN RCRD: CPT | Mod: CPTII,,, | Performed by: STUDENT IN AN ORGANIZED HEALTH CARE EDUCATION/TRAINING PROGRAM

## 2023-12-26 PROCEDURE — 3008F BODY MASS INDEX DOCD: CPT | Mod: CPTII,,, | Performed by: STUDENT IN AN ORGANIZED HEALTH CARE EDUCATION/TRAINING PROGRAM

## 2023-12-26 PROCEDURE — 1160F PR REVIEW ALL MEDS BY PRESCRIBER/CLIN PHARMACIST DOCUMENTED: ICD-10-PCS | Mod: CPTII,,, | Performed by: STUDENT IN AN ORGANIZED HEALTH CARE EDUCATION/TRAINING PROGRAM

## 2023-12-26 PROCEDURE — 1159F MED LIST DOCD IN RCRD: CPT | Mod: CPTII,,, | Performed by: STUDENT IN AN ORGANIZED HEALTH CARE EDUCATION/TRAINING PROGRAM

## 2023-12-26 PROCEDURE — 3078F PR MOST RECENT DIASTOLIC BLOOD PRESSURE < 80 MM HG: ICD-10-PCS | Mod: CPTII,,, | Performed by: STUDENT IN AN ORGANIZED HEALTH CARE EDUCATION/TRAINING PROGRAM

## 2023-12-26 PROCEDURE — 99213 OFFICE O/P EST LOW 20 MIN: CPT | Mod: S$PBB,,, | Performed by: STUDENT IN AN ORGANIZED HEALTH CARE EDUCATION/TRAINING PROGRAM

## 2023-12-26 PROCEDURE — 99999 PR PBB SHADOW E&M-EST. PATIENT-LVL III: CPT | Mod: PBBFAC,,, | Performed by: STUDENT IN AN ORGANIZED HEALTH CARE EDUCATION/TRAINING PROGRAM

## 2023-12-26 PROCEDURE — 1159F PR MEDICATION LIST DOCUMENTED IN MEDICAL RECORD: ICD-10-PCS | Mod: CPTII,,, | Performed by: STUDENT IN AN ORGANIZED HEALTH CARE EDUCATION/TRAINING PROGRAM

## 2023-12-26 RX ORDER — SODIUM FLUORIDE 1.1 G/100G
CREAM ORAL
COMMUNITY
Start: 2023-12-15

## 2023-12-26 NOTE — PROGRESS NOTES
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  This was previously noted to increase in size over a short period and has been associated w/ tenderness and burning pain which has increased since his last visit and occasionally wakes him from sleep and is bothersome when exercising. No change in size or other new symptoms.    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 Torres.      Review of patient's allergies indicates:  No Known Allergies    Current Outpatient Medications   Medication Sig Dispense Refill    DENTA 5000 PLUS 1.1 % Crea Take by mouth.       No current facility-administered medications for this visit.       No past medical history on file.  Past Surgical History:   Procedure Laterality Date    CIRCUMCISION, PRIMARY       Family History    None       Social History     Socioeconomic History    Marital status: Single   Tobacco Use    Smoking status: Never   Social History Narrative    12th grade at [employer]       Review of Systems   Eyes:         Lisch nodules   Skin:         Neurfibromas  Axillary freckling  Cafe au lait spots   All other systems reviewed and are negative.      OBJECTIVE:     Vital Signs  Pulse: 73  BP: 117/73  Pain Score: 0-No pain  Height: 5' 10" (177.8 cm)  Weight: 72.6 kg (160 lb)  Body mass index is 22.96 kg/m².    Physical Exam:  Nursing note and vitals reviewed.      Neuro:  Mental Status: Alert and oriented. Oriented x 4  Language/language: No aphasia. No dysarthria. Cranial nerves: EOMI, face symmetric   Sensory: intact to light touch throughout  Motor Strength: Full strength bilateral lower extremities.    Gait stable. Able to walk on heels & toes  Extremities: left inner thigh soft tissue mass is firm, non-tender      Diagnostic Results:  No new " imaging    ASSESSMENT/PLAN:     24 yo male with NF1 with multiple cutaneous neurofibromas who is interested in surgical excision of a likley neurofibroma involving the left thigh just above the knee due to increased pain and discomfort. This was previously noted to grow in size over a short time course with irregular enhancement on imaging but without significant interval growth on short interval follow up imaging.  The mass is more firm on exam today and in light of worsening symptoms will need updated imaging.     - f/u after MR femur        Note dictated with voice recognition software, please excuse any grammatical errors.

## 2024-01-30 ENCOUNTER — HOSPITAL ENCOUNTER (OUTPATIENT)
Dept: RADIOLOGY | Facility: HOSPITAL | Age: 24
Discharge: HOME | End: 2024-01-30
Attending: STUDENT IN AN ORGANIZED HEALTH CARE EDUCATION/TRAINING PROGRAM
Payer: MEDICAID

## 2024-01-30 DIAGNOSIS — Q85.01 NEUROFIBROMATOSIS, TYPE 1: ICD-10-CM

## 2024-01-30 PROCEDURE — A9585 GADOBUTROL INJECTION: HCPCS | Performed by: STUDENT IN AN ORGANIZED HEALTH CARE EDUCATION/TRAINING PROGRAM

## 2024-01-30 PROCEDURE — 73720 MRI LWR EXTREMITY W/O&W/DYE: CPT | Mod: TC,LT

## 2024-01-30 PROCEDURE — 73720 MRI LWR EXTREMITY W/O&W/DYE: CPT | Mod: 26,LT,, | Performed by: RADIOLOGY

## 2024-01-30 PROCEDURE — 25500020 PHARM REV CODE 255: Performed by: STUDENT IN AN ORGANIZED HEALTH CARE EDUCATION/TRAINING PROGRAM

## 2024-01-30 RX ORDER — GADOBUTROL 604.72 MG/ML
8 INJECTION INTRAVENOUS
Status: COMPLETED | OUTPATIENT
Start: 2024-01-30 | End: 2024-01-30

## 2024-01-30 RX ADMIN — GADOBUTROL 8 ML: 604.72 INJECTION INTRAVENOUS at 05:01

## 2024-02-05 ENCOUNTER — OFFICE VISIT (OUTPATIENT)
Dept: NEUROSURGERY | Facility: CLINIC | Age: 24
End: 2024-02-05
Payer: MEDICAID

## 2024-02-05 DIAGNOSIS — C72.30 OPTIC NERVE GLIOMA: ICD-10-CM

## 2024-02-05 DIAGNOSIS — Q85.01 NEUROFIBROMATOSIS, TYPE 1: Primary | ICD-10-CM

## 2024-02-05 PROCEDURE — 1160F RVW MEDS BY RX/DR IN RCRD: CPT | Mod: CPTII,95,, | Performed by: STUDENT IN AN ORGANIZED HEALTH CARE EDUCATION/TRAINING PROGRAM

## 2024-02-05 PROCEDURE — 99213 OFFICE O/P EST LOW 20 MIN: CPT | Mod: 95,,, | Performed by: STUDENT IN AN ORGANIZED HEALTH CARE EDUCATION/TRAINING PROGRAM

## 2024-02-05 PROCEDURE — 1159F MED LIST DOCD IN RCRD: CPT | Mod: CPTII,95,, | Performed by: STUDENT IN AN ORGANIZED HEALTH CARE EDUCATION/TRAINING PROGRAM

## 2024-02-05 NOTE — PROGRESS NOTES
The patient location is: [place]  The chief complaint leading to consultation is: f/u after imaging    Visit type: audiovisual    Face to Face time with patient: 15-20 min  35 minutes of total time spent on the encounter, which includes face to face time and non-face to face time preparing to see the patient (eg, review of tests), Obtaining and/or reviewing separately obtained history, Documenting clinical information in the electronic or other health record, Independently interpreting results (not separately reported) and communicating results to the patient/family/caregiver, or Care coordination (not separately reported).         Each patient to whom he or she provides medical services by telemedicine is:  (1) informed of the relationship between the physician and patient and the respective role of any other health care provider with respect to management of the patient; and (2) notified that he or she may decline to receive medical services by telemedicine and may withdraw from such care at any time.    Notes:     Carli Gunn is a 22 yo male with h/o NF1 who last seen by me in July 2023 and returns today to discuss surgical options for likely neurofibroma of the left inner thigh.  This was previously noted to increase in size over a short period and has been associated w/ tenderness and burning pain which has increased since his last visit and occasionally wakes him from sleep and is bothersome when exercising. No change in size or other new symptoms.     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 Torres.      [facility] which was reviewed by me and is stable.    He would like to again discuss surgical options for the larger medial left thigh & left forehead neurofibromas due to associated discomfort and cosmetic concerns, however, he is now planning to move in March.  The details of the move are currently unknown, but he will need to establish neurosurgical care once he  relocates and I will assist with a referral when he knows where he will be going.  If he remains in [place], will plan on continued follow up & surveillance for NF1.

## 2024-04-04 ENCOUNTER — HOSPITAL ENCOUNTER (EMERGENCY)
Facility: HOSPITAL | Age: 24
Discharge: HOME | End: 2024-04-04
Attending: EMERGENCY MEDICINE
Payer: COMMERCIAL

## 2024-04-04 VITALS
HEIGHT: 71 IN | BODY MASS INDEX: 23.1 KG/M2 | RESPIRATION RATE: 20 BRPM | SYSTOLIC BLOOD PRESSURE: 119 MMHG | DIASTOLIC BLOOD PRESSURE: 54 MMHG | OXYGEN SATURATION: 100 % | WEIGHT: 165 LBS | HEART RATE: 82 BPM | TEMPERATURE: 98 F
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
## 2024-04-04 DIAGNOSIS — G44.319 ACUTE POST-TRAUMATIC HEADACHE, NOT INTRACTABLE: Primary | ICD-10-CM

## 2024-04-04 PROCEDURE — 99283 EMERGENCY DEPT VISIT LOW MDM: CPT

## 2024-04-04 RX ORDER — IBUPROFEN 800 MG/1
800 TABLET ORAL EVERY 6 HOURS PRN
Qty: 20 TABLET | Refills: 0 | Status: SHIPPED | OUTPATIENT
Start: 2024-04-04

## 2024-04-04 NOTE — DISCHARGE INSTRUCTIONS
Drink lots of fluids, stay well hydrated. Alternate Tylenol/Ibuprofen as needed for pain/ fever; go back and forth between these two medications every 4 hrs as needed for temp greater than or equal to 100.4F.  You may take 800 ibuprofen and 500 to a 1000 mg of Tylenol at 1 time.  Maximum dose of Tylenol and 1 day is 3000 mg in the maximum dose of ibuprofen and 1 day is 1200mg.     Return immediately to the emergency department for nausea, vomiting, inability to tolerate by mouth, persistent headaches, any abnormal behaviors or excessive lethargy, or with any other concerns. The patient instructed to follow up with PCP in the next 2 days.
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
